# Patient Record
Sex: FEMALE | Race: WHITE | Employment: OTHER | URBAN - METROPOLITAN AREA
[De-identification: names, ages, dates, MRNs, and addresses within clinical notes are randomized per-mention and may not be internally consistent; named-entity substitution may affect disease eponyms.]

---

## 2017-05-11 ENCOUNTER — HOSPITAL ENCOUNTER (OUTPATIENT)
Dept: MAMMOGRAPHY | Age: 67
Discharge: HOME | End: 2017-05-11
Payer: MEDICARE

## 2017-05-11 DIAGNOSIS — Z12.31 VISIT FOR SCREENING MAMMOGRAM: ICD-10-CM

## 2017-05-11 PROCEDURE — 77063 BREAST TOMOSYNTHESIS BI: CPT

## 2017-05-17 ENCOUNTER — HOSPITAL ENCOUNTER (OUTPATIENT)
Dept: LAB | Age: 67
Discharge: HOME | End: 2017-05-17
Payer: MEDICARE

## 2017-05-17 PROCEDURE — 88175 CYTOPATH C/V AUTO FLUID REDO: CPT | Performed by: OBSTETRICS & GYNECOLOGY

## 2018-05-15 ENCOUNTER — HOSPITAL ENCOUNTER (OUTPATIENT)
Dept: MAMMOGRAPHY | Age: 68
Discharge: HOME | End: 2018-05-15
Attending: OBSTETRICS & GYNECOLOGY
Payer: MEDICARE

## 2018-05-15 DIAGNOSIS — Z12.39 BREAST CANCER SCREENING: ICD-10-CM

## 2018-05-15 PROCEDURE — 77067 SCR MAMMO BI INCL CAD: CPT

## 2018-05-24 ENCOUNTER — HOSPITAL ENCOUNTER (OUTPATIENT)
Dept: GENERAL RADIOLOGY | Age: 68
Discharge: HOME | End: 2018-05-24
Payer: MEDICARE

## 2018-05-24 DIAGNOSIS — R06.00 DYSPNEA: ICD-10-CM

## 2018-05-24 PROCEDURE — 71046 X-RAY EXAM CHEST 2 VIEWS: CPT

## 2018-06-23 ENCOUNTER — HOSPITAL ENCOUNTER (OUTPATIENT)
Dept: NON INVASIVE DIAGNOSTICS | Age: 68
Discharge: HOME | End: 2018-06-23
Attending: INTERNAL MEDICINE
Payer: MEDICARE

## 2018-06-23 VITALS
SYSTOLIC BLOOD PRESSURE: 150 MMHG | WEIGHT: 185 LBS | BODY MASS INDEX: 30.82 KG/M2 | DIASTOLIC BLOOD PRESSURE: 71 MMHG | HEIGHT: 65 IN

## 2018-06-23 DIAGNOSIS — R06.00 DYSPNEA: ICD-10-CM

## 2018-06-23 LAB
ECHO AO ASC DIAM: 2.89 CM
ECHO AO ROOT DIAM: 2.56 CM
ECHO AV PEAK GRADIENT: 0 MMHG
ECHO AV PEAK VELOCITY: 0 CM/S
ECHO AV REGURGITANT PHT: 696.6 CM
ECHO IVC SNIFF: 2.14 CM
ECHO LA VOL 2C: 55.46 ML (ref 22–52)
ECHO LA VOL 4C: 69.66 ML (ref 22–52)
ECHO LA VOLUME INDEX A2C: 28.98 ML/M2
ECHO LA VOLUME INDEX A4C: 36.4 ML/M2
ECHO LV EDV A2C: 102.5 ML
ECHO LV EDV A4C: 85.8 ML
ECHO LV EDV BP: 94.5 ML (ref 56–104)
ECHO LV EDV INDEX A4C: 44.8 ML/M2
ECHO LV EDV INDEX BP: 49.4 ML/M2
ECHO LV EDV NDEX A2C: 53.6 ML/M2
ECHO LV EJECTION FRACTION A2C: 62 %
ECHO LV EJECTION FRACTION A4C: 57 %
ECHO LV EJECTION FRACTION BIPLANE: 59.2 % (ref 55–100)
ECHO LV ESV A2C: 39.3 ML
ECHO LV ESV A4C: 36.5 ML
ECHO LV ESV BP: 38.5 ML (ref 19–49)
ECHO LV ESV INDEX A2C: 20.5 ML/M2
ECHO LV ESV INDEX A4C: 19.1 ML/M2
ECHO LV ESV INDEX BP: 20.1 ML/M2
ECHO LV INTERNAL DIMENSION DIASTOLIC: 4.54 CM (ref 3.9–5.3)
ECHO LV INTERNAL DIMENSION SYSTOLIC: 3.14 CM
ECHO LV IVSD: 0.83 CM (ref 0.6–0.9)
ECHO LV MASS 2D: 144 G (ref 67–162)
ECHO LV MASS INDEX 2D: 75.2 G/M2
ECHO LV POSTERIOR WALL DIASTOLIC: 0.89 CM (ref 0.6–0.9)
ECHO LVOT PEAK GRADIENT: 5.6 MMHG
ECHO LVOT PEAK VELOCITY: 118.38 CM/S
ECHO LVOT VTI: 25.28 CM
ECHO MV A VELOCITY: 89.76 CM/S
ECHO MV AREA PHT: 4 CM2
ECHO MV E DECELERATION TIME (DT): 189.9 MS
ECHO MV E VELOCITY: 0.71 CM/S
ECHO MV E/A RATIO: 0.8
ECHO MV PRESSURE HALF TIME (PHT): 55.1 MS
ECHO PV MAX VELOCITY: 108.2 CM/S
ECHO PV PEAK GRADIENT: 4.7 MMHG
ECHO PV REGURGITANT MAX VELOCITY: 163.51 CM/S
ECHO PVEIN A DURATION: 125.7 MS
ECHO PVEIN A VELOCITY: 27.49 CM/S
ECHO PVEIN PEAK D VELOCITY: 38.87 CM/S
ECHO PVEIN PEAK S VELOCITY: 69.8 CM/S
ECHO PVEIN S/D RATIO: 1.8
ECHO RIGHT VENTRICULAR SYSTOLIC PRESSURE (RVSP): 33 MMHG
ECHO RVOT DIAMETER: 3.27 CM
PISA AR MAX VEL: 392.53 CM/S

## 2018-06-23 PROCEDURE — 93306 TTE W/DOPPLER COMPLETE: CPT

## 2018-07-20 ENCOUNTER — HOSPITAL ENCOUNTER (OUTPATIENT)
Dept: PHYSICAL THERAPY | Age: 68
Discharge: HOME | End: 2018-07-20
Payer: MEDICARE

## 2018-07-20 PROCEDURE — 97112 NEUROMUSCULAR REEDUCATION: CPT

## 2018-07-20 PROCEDURE — G8987 SELF CARE CURRENT STATUS: HCPCS

## 2018-07-20 PROCEDURE — 97161 PT EVAL LOW COMPLEX 20 MIN: CPT

## 2018-07-20 PROCEDURE — G8988 SELF CARE GOAL STATUS: HCPCS

## 2018-07-20 NOTE — PROGRESS NOTES
[address] PHYSICAL THERAPY AT [facility] [address], [place] [address], [place] [address] - Phone: [phone]  Fax: [phone] / [address] [facility]  Patient Name: Genesis Cooper :    Medical   Diagnosis: Urinary incontinence [R32] Treatment Diagnosis: Urinary incontinence [R32]   Onset Date: 2018     Referral Source: Gilberto Joyner MD [facility]): 2018   Prior Hospitalization: See medical history Provider #: [id number]   Prior Level of Function: Chronic urinary symptoms for the past 2 years. Comorbidities: G3, P2, Colon surgery   Medications: Verified on Patient Summary List   The Plan of Care and following information is based on the information from the initial evaluation.   ==================================================================================  Assessment / key information: Patient is a 77 y.o. yo female  with vaginal deliveries who presents to [facility] with diagnosis of Urinary incontinence [R32]. Patient reports urinary incontinence occurring multiple times daily, urgency with inabiiity to delay at all, increased frequency of urination at 9-12 times daily and nocturia 2x nightly. Patient wears pantishields for protection. Patient presents to PT with severely impaired strength of pelvic floor muscles scoring 2-/0/0/10 on PERF. On biofeedback there was low  average fast twitch contraction at 5.52 microvolts and low average slow twitch contraction at 3.39 microvolts. She has impaired strength of both hips.  Patient can benefit from PT for retraining of muscle control and relaxation on biofeedback to increase pelvic floor muscle strength, decrease urinary incontinence, urgency and nocturia.    ==================================================================================  Eval Complexity: History: HIGH Complexity :3+ comorbidities / personal factors will impact the outcome/ POC Exam:HIGH Complexity : 4+ Standardized tests and measures addressing body structure, function, activity limitation and / or participation in recreation  Presentation: LOW Complexity : Stable, uncomplicated  Clinical Decision Making:Other outcome measures subjective improvement in urinary symptoms  HIGH Overall Complexity:LOW   Problem List: Pelvic pain/dysfunction, Decreased pelvic floor mm awareness, Decreased pelvic floor mm strength, Urinary urgency and Other  Treatment Plan may include any combination of the following: Therapeutic exercise, Urge suppression techniques, Neuromuscular re-education, Manual therapy, Physical agent/modality and Patient education  Patient / Family readiness to learn indicated by: asking questions, trying to perform skills and interest  Persons(s) to be included in education: patient (P)  Barriers to Learning/Limitations: yes;  sensory deficits-vision/hearing/speech  Measures taken: Speak loudly as needed   Patient Goal (s): Stop leaking and dribbling. Be able to make it to the bathroom. Patient self reported health status: good  Rehabilitation Potential: good  Short Term Goals: To be accomplished in 4 weeks:   1. Patient performing pelvic floor exercises TID. 2. Patient will report 25% subjective improvement in urinary incontinence with ADLS. 3. Increase average fast twitch contraction to 7 microvolts to increase continence. 4. Patient using urge suppression techniques. Long Term Goals: To be accomplished in 8 weeks:   1. Patient independent in HEP     2. Patient will report 50% subjective improvement in urinary incontinence with ADLs. 3. Increase average fast twitch contraction to 9 microvolts to increase continence. 4. Nocturia decreased to 2x nightly. 5. Patient able to delay urination 5 minutes.    Frequency / Duration:   Patient to be seen  1  times per week for 8  weeks:  Patient / Caregiver education and instruction:Exercises and Bladder Retraining  G-Codes (GP): Self Care:   Current  CN= 100%  [id number] Goal  CL= 60-79%. The severity rating is based on the FOTO Score/subjective improvement in urinary symptoms with ADLs      Therapist Signature: Lauren Garcia PT Date: 3/31/4750   Certification Period: 7/20/2018 to 10/19/2018 Time: 10:00 AM   ==================================================================================  I certify that the above Physical Therapy Services are being furnished while the patient is under my care. I agree with the treatment plan and certify that this therapy is necessary. Physician Signature:        Date:       Time:     Please sign and return to [facility] or you may fax the signed copy to [phone]. Thank you.

## 2018-07-20 NOTE — PROGRESS NOTES
PELVIC FLOOR DAILY TREATMENT NOTE     Patient Name: Garcia Bueno  NSBP:  : 1950  [x]  Patient  Verified  Payor: [place] MEDICARE / Plan: [place] MEDICARE PART A & B / Product Type: Medicare /    In time:9:10  Out time:9:57  Total Treatment Time (min): 47  Total Timed Codes (min): 10  1:1 Treatment Time (min): 10   Visit #: 1 of 8    Treatment Area: Urinary incontinence [R32]    SUBJECTIVE  Pain Level (0-10 scale): 0  Any medication changes, allergies to medications, adverse drug reactions, diagnosis change, or new procedure performed?: [x] No    [] Yes (see summary sheet for update)  Subjective functional status/changes:   [] No changes reported  See medical history    OBJECTIVE            10 min Patient Education: [x] Review HEP    [] Progressed/Changed HEP based on: Educated Pt in pelvic floor anatomy, function/dysfunction and correct execution of a pelvic floor contraction. Reviewed biofeedback results. positioning   [] body mechanics   [] transfers   [] heat/ice application          Pain Level (0-10 scale) post treatment: 0    ASSESSMENT/Changes in Function: Justification for Eval Code Complexity:  Patient History : G3, P3, chronic, Colon surgery  Examination see exam   Clinical Presentation: stable  Clinical Decision Making : Subjective improvement in urinary symptoms      Patient will continue to benefit from skilled PT services to modify and progress therapeutic interventions, address strength deficits, instruct in home and community integration and address UI, increased frequency, urgency and nocturia. to attain remaining goals. [x]  See Plan of Care  []  See progress note/recertification  []  See Discharge Summary         Progress towards goals / Updated goals:  Initial evaluation completed with home exercise program and education initiated.       PLAN  [x]  Upgrade activities as tolerated     []  Continue plan of care  []  Update interventions per flow sheet       []  Discharge due to:_  []  Other:_      Adelina Talamantes, PT 7/20/2018  9:10 AM      No future appointments.

## 2018-07-27 ENCOUNTER — HOSPITAL ENCOUNTER (OUTPATIENT)
Dept: PHYSICAL THERAPY | Age: 68
Discharge: HOME | End: 2018-07-27
Payer: MEDICARE

## 2018-07-27 PROCEDURE — 97112 NEUROMUSCULAR REEDUCATION: CPT

## 2018-07-27 PROCEDURE — 97110 THERAPEUTIC EXERCISES: CPT

## 2018-07-27 NOTE — PROGRESS NOTES
PELVIC FLOOR DAILY TREATMENT NOTE 8- Patient Name: Beryl Wood Date:2018 : 1950 [x]  Patient  Verified Payor: [place] MEDICARE / Plan: Dinesh Riveray / Product Type: Medicare / In time:7:39  Out time:8:23 Total Treatment Time (min): 44 Total Timed Codes (min): 44 
1:1 Treatment Time (min): 44 Visit #: 2 of 8 Treatment Area: Urge incontinence [N39.41] SUBJECTIVE Pain Level (0-10 scale): 0 Any medication changes, allergies to medications, adverse drug reactions, diagnosis change, or new procedure performed?: [x] No    [] Yes (see summary sheet for update) Subjective functional status/changes:   [] No changes reported Patient reports doing HEP 2x day. OBJECTIVE Modality rationale: Neuromuscular reeducation to improve patient's urinary incontinence occurring multiple times daily, urgency with inabiiity to delay at all, increased frequency of urination at 9-12 times daily and nocturia 2x nightly. Patient wears pantishields for protection. Min Type Additional Details 36 [x] Biofeedback x 36 minutes 
  supine vaginal  
 [] Estim: []Att   []Unatt        []TENS instruct []IFC  []Premod   []NMES []Other:  []w/US   []w/ice   []w/heat Position: Location:  
 []  Traction: [] Cervical       []Lumbar 
                     [] Prone          []Supine []Intermittent   []Continuous Lbs: 
[] before manual 
[] after manual  
 []  Ultrasound: []Continuous   [] Pulsed []1MHz   []3MHz Location: 
W/cm2:  
 []  Iontophoresis with dexamethasone Location: [] Take home patch  
[] In clinic  
 []  Ice     []  heat 
[]  Ice massage Position: Location:  
 []  Vasopneumatic Device Pressure:       [] lo [] med [] hi  
Temperature: [] lo [] med [] hi  
[x] Skin assessment post-treatment:  [x]intact []redness- no adverse reaction 
     []redness  adverse reaction:  
 
 
       
8  min Patient Education: [x] Review HEP    [] Progressed/Changed HEP based on: Bladder diary. Increase slow twitch to 5 second holds. [] positioning   [] body mechanics   [] transfers   [] heat/ice application Other Objective/Functional Measures:  
 []baseline resting tone: - 
 [x]slow twitch mms 17.7(11.4) [x]fast twitch mms16.4(8.86) Pain Level (0-10 scale) post treatment: 0 
 
ASSESSMENT/Changes in Function: Patient demonstrates improved strength of pelvic floor muscles with fair HEP compliance. Patient will continue to benefit from skilled PT services to modify and progress therapeutic interventions, address strength deficits, instruct in home and community integration and address UI, increased frequency, urgency and nocturia. to attain remaining goals. 
   
 
[]  See Plan of Care 
[]  See progress note/recertification 
[]  See Discharge Summary Progress towards goals / Updated goals: 1. Patient performing pelvic floor exercises TID. Met 2. Patient will report 25% subjective improvement in urinary incontinence with ADLS. 3. Increase average fast twitch contraction to 7 microvolts to increase continence. Met 4. Patient using urge suppression techniques PLAN [x]  Upgrade activities as tolerated     []  Continue plan of care 
[]  Update interventions per flow sheet      
[]  Discharge due to:_ 
[]  Other:_ Clinton Bender, PT 7/27/2018  7:39 AM 
 
 
Future Appointments Date Time Provider Bennie Vásquez 8/3/2018 8:15 AM Clinton Bender, PT [facility]  
8/10/2018 8:15 AM Clinton Bender, PT [facility]  
8/17/2018 8:15 AM Clinton Bender, PT [facility]  
8/31/2018 8:15 AM Clinton Bender, PT [facility]  
9/7/2018 8:15 AM Clinton Bender, PT [facility]  
9/14/2018 8:15 AM Clinton Bender, PT [facility]

## 2018-08-03 ENCOUNTER — HOSPITAL ENCOUNTER (OUTPATIENT)
Dept: PHYSICAL THERAPY | Age: 68
Discharge: HOME | End: 2018-08-03
Payer: MEDICARE

## 2018-08-03 PROCEDURE — 97110 THERAPEUTIC EXERCISES: CPT

## 2018-08-03 PROCEDURE — 97112 NEUROMUSCULAR REEDUCATION: CPT

## 2018-08-03 NOTE — PROGRESS NOTES
PELVIC FLOOR DAILY TREATMENT NOTE  Patient Name: Jayden Medellin Date:8/3/2018 : 1950 [x]  Patient  Verified Payor: [place] MEDICARE / Plan: Dinesh Espinoza y / Product Type: Medicare / In time:8:32  Out time:9:07 Total Treatment Time (min): 35 Total Timed Codes (min): 35 
1:1 Treatment Time (min): 35 Visit #: 3 of 8 Treatment Area: Urge incontinence [N39.41] SUBJECTIVE Pain Level (0-10 scale): 0 Any medication changes, allergies to medications, adverse drug reactions, diagnosis change, or new procedure performed?: [x] No    [] Yes (see summary sheet for update) Subjective functional status/changes:   [] No changes reported HEP 3x day OBJECTIVE Modality rationale: Neuromuscular reeducation to improve patient's urinary incontinence occurring multiple times daily, urgency with inabiiity to delay at all, increased frequency of urination at 9-12 times daily and nocturia 2x nightly.  Patient wears pantishields for protection. Min Type Additional Details 25 [x] Biofeedback x 25 minutes 
  supine vaginal  
 [] Estim: []Att   []Unatt        []TENS instruct []IFC  []Premod   []NMES []Other:  []w/US   []w/ice   []w/heat Position: Location:  
 []  Traction: [] Cervical       []Lumbar 
                     [] Prone          []Supine []Intermittent   []Continuous Lbs: 
[] before manual 
[] after manual  
 []  Ultrasound: []Continuous   [] Pulsed []1MHz   []3MHz Location: 
W/cm2:  
 []  Iontophoresis with dexamethasone Location: [] Take home patch  
[] In clinic  
 []  Ice     []  heat 
[]  Ice massage Position: Location:  
 []  Vasopneumatic Device Pressure:       [] lo [] med [] hi  
Temperature: [] lo [] med [] hi  
[x] Skin assessment post-treatment:  [x]intact []redness- no adverse reaction 
     []redness  adverse reaction:  
 
10 min Therapeutic Exercise:  [x] See flow sheet : 
[] Pelvic floor strengthening                []  Pelvic floor downtraining 
[]  Quality pelvic floor contractions      []  Relaxation techniques [x]  Urge suppression exercises 
[]  Other:   
Rationale:  to improve the patients ability to delay urination. min Patient Education: [x] Review HEP    [] Progressed/Changed HEP based on: Begin urge suppression techniques. [] positioning   [] body mechanics   [] transfers   [] heat/ice application Other Objective/Functional Measures: Bladder diary daytime average frequency 6x. []baseline resting tone: - 
 [x]slow twitch mms 13.7 [x]fast twitch mms12.9 Pain Level (0-10 scale) post treatment: 0 
 
ASSESSMENT/Changes in Function: Patient demonstrates improved strength of pelvic floor muscles. Patient will continue to benefit from skilled PT services to modify and progress therapeutic interventions, address strength deficits, instruct in home and community integration and address UI, increased frequency, urgency and nocturia. to attain remaining goals. 
   
  
[]  See Plan of Care 
[]  See progress note/recertification 
[]  See Discharge Summary 
   
Progress towards goals / Updated goals: 
                         1. Patient performing pelvic floor exercises TID.  Met 
                         2. Patient will report 25% subjective improvement in urinary incontinence with ADLS.                          3. Increase average fast twitch contraction to 7 microvolts to increase continence. Met 
                         4. Patient using urge suppression techniques. 
  
 
PLAN [x]  Upgrade activities as tolerated     []  Continue plan of care 
[]  Update interventions per flow sheet      
[]  Discharge due to:_ 
[]  Other:_ Lauren Garcia, PT 8/3/2018  8:32 AM 
 
 
Future Appointments Date Time Provider Bennie Vásquez 8/10/2018 8:15 AM Lauren Garcia, PT [facility]  
8/17/2018 8:15 AM Lauren Garcia, PT [facility]  
8/31/2018 8:15 AM Launie Saint, PT [facility]  
9/7/2018 8:15 AM Launie Saint, PT [facility]  
9/14/2018 8:15 AM Launie Saint, PT [facility]

## 2018-08-10 ENCOUNTER — HOSPITAL ENCOUNTER (OUTPATIENT)
Dept: PHYSICAL THERAPY | Age: 68
Discharge: HOME | End: 2018-08-10
Payer: MEDICARE

## 2018-08-10 PROCEDURE — 97014 ELECTRIC STIMULATION THERAPY: CPT

## 2018-08-10 PROCEDURE — 97112 NEUROMUSCULAR REEDUCATION: CPT

## 2018-08-10 NOTE — PROGRESS NOTES
PELVIC FLOOR DAILY TREATMENT NOTE     Patient Name: Ana Angeles  MBHT:  : 1950  [x]  Patient  Verified  Payor: [place] MEDICARE / Plan: [place] MEDICARE PART A & B / Product Type: Medicare /    In time:8:19  Out time:9:05  Total Treatment Time (min): 46  Total Timed Codes (min): 36  1:1 Treatment Time (min): 36   Visit #: 4 of 8    Treatment Area: Urge incontinence [N39.41]    SUBJECTIVE  Pain Level (0-10 scale): 0  Any medication changes, allergies to medications, adverse drug reactions, diagnosis change, or new procedure performed?: [x] No    [] Yes (see summary sheet for update)  Subjective functional status/changes:   [] No changes reported  Able to make it to the bathroom when first arising by using urge suppression techniques. OBJECTIVE  Modality rationale: Neuromuscular reeducation to improve patient's urinary incontinence occurring multiple times daily, urgency with inabiiity to delay at all, increased frequency of urination at 9-12 times daily and nocturia 2x nightly.  Patient wears pantishields for protection.    Min Type Additional Details   36 [x] Biofeedback x 36 minutes    supine and sit vaginal   10 [x] Estim: []Att   [x]Unatt        []TENS instruct                  []IFC  []Premod   [x]NMES 50 Hz 5/10 intensity 6                    []Other:  []w/US   []w/ice   []w/heat  Position:  Location:    []  Traction: [] Cervical       []Lumbar                       [] Prone          []Supine                       []Intermittent   []Continuous Lbs:  [] before manual  [] after manual    []  Ultrasound: []Continuous   [] Pulsed                           []1MHz   []3MHz Location:  W/cm2:    []  Iontophoresis with dexamethasone         Location: [] Take home patch   [] In clinic    []  Ice     []  heat  []  Ice massage Position:  Location:    []  Vasopneumatic Device Pressure:       [] lo [] med [] hi   Temperature: [] lo [] med [] hi   [x] Skin assessment post-treatment:  [x]intact []redness- no adverse reaction       []redness  adverse reaction:               min Patient Education: [x] Review HEP    [] Progressed/Changed HEP based on: HEP with add via pillow  [] positioning   [] body mechanics   [] transfers   [] heat/ice application        Other Objective/Functional Measures:    []baseline resting tone: -   [x]slow twitch mms -   [x]fast twitch mms10.9(6.52), 21.1(10.5) with add    Pain Level (0-10 scale) post treatment: 0    ASSESSMENT/Changes in Function:  Added accessory muscle use and NMES to PF muscles due to patient slow to strengthen. Patient will continue to benefit from skilled PT services to modify and progress therapeutic interventions, address strength deficits, instruct in home and community integration and address UI, increased frequency, urgency and nocturia. to attain remaining goals.      []  See Plan of Care  []  See progress note/recertification  []  See Discharge Summary         Progress towards goals / Updated goals:                           1. Patient performing pelvic floor exercises TID.  Met                           2. Patient will report 25% subjective improvement in urinary incontinence with ADLS.                          3. Increase average fast twitch contraction to 7 microvolts to increase continence.  Met                           4. Patient using urge suppression techniques.       PLAN  [x]  Upgrade activities as tolerated     []  Continue plan of care  []  Update interventions per flow sheet       []  Discharge due to:_  []  Other:_      Irene Burnham, PT 8/10/2018  8:19 AM      Future Appointments  Date Time Provider Bennie Vásquez   8/17/2018 8:15 AM Irene Burnham PT [facility]   8/31/2018 8:15 AM Irene Burnham PT [facility]   9/7/2018 8:15 AM Irene Burnham PT [facility]   9/14/2018 8:15 AM Irene Burnham, PT [facility]

## 2018-08-17 ENCOUNTER — HOSPITAL ENCOUNTER (OUTPATIENT)
Dept: PHYSICAL THERAPY | Age: 68
Discharge: HOME | End: 2018-08-17
Payer: MEDICARE

## 2018-08-17 PROCEDURE — 97014 ELECTRIC STIMULATION THERAPY: CPT

## 2018-08-17 PROCEDURE — 97112 NEUROMUSCULAR REEDUCATION: CPT

## 2018-08-17 NOTE — PROGRESS NOTES
PELVIC FLOOR DAILY TREATMENT NOTE     Patient Name: Tianna Oropeza  IOTA:  : 1950  [x]  Patient  Verified  Payor: [place] MEDICARE / Plan: [place] MEDICARE PART A & B / Product Type: Medicare /    In time:8:30  Out time:9:17  Total Treatment Time (min): 47  Total Timed Codes (min): 37  1:1 Treatment Time (min): 37   Visit #: 5 of 8    Treatment Area: Urge incontinence [N39.41]    SUBJECTIVE  Pain Level (0-10 scale): 0  Any medication changes, allergies to medications, adverse drug reactions, diagnosis change, or new procedure performed?: [x] No    [] Yes (see summary sheet for update)  Subjective functional status/changes:   [] No changes reported  \"I'm not having accidents like I used to\"    OBJECTIVE  Modality rationale: Neuromuscular reeducation to improve patient's urinary incontinence occurring multiple times daily, urgency with inabiiity to delay at all, increased frequency of urination at 9-12 times daily and nocturia 2x nightly.  Patient wears pantishields for protection.    Min Type Additional Details   37 [x] Biofeedback x 37 minutes    supine and sit vaginal with add   10 [x] Estim: []Att   [x]Unatt        []TENS instruct                  []IFC  []Premod   [x]NMES   50 hz 5/10 intensity 20                   []Other:  []w/US   []w/ice   []w/heat  Position: supine   Location: vaginal    []  Traction: [] Cervical       []Lumbar                       [] Prone          []Supine                       []Intermittent   []Continuous Lbs:  [] before manual  [] after manual    []  Ultrasound: []Continuous   [] Pulsed                           []1MHz   []3MHz Location:  W/cm2:    []  Iontophoresis with dexamethasone         Location: [] Take home patch   [] In clinic    []  Ice     []  heat  []  Ice massage Position:  Location:    []  Vasopneumatic Device Pressure:       [] lo [] med [] hi   Temperature: [] lo [] med [] hi   [x] Skin assessment post-treatment:  [x]intact []redness- no adverse reaction []redness  adverse reaction:                  min Patient Education: [x] Review HEP    [] Progressed/Changed HEP based on:   [] positioning   [] body mechanics   [] transfers   [] heat/ice application        Other Objective/Functional Measures:    []baseline resting tone: -   [x]slow twitch mms 24.6(12.7) with add seated   [x]fast twitch mms26.3(11.7)    Pain Level (0-10 scale) post treatment: 0    ASSESSMENT/Changes in Function: improved continence. Patient will continue to benefit from skilled PT services to modify and progress therapeutic interventions, address strength deficits, instruct in home and community integration and address UI, increased frequency, urgency and nocturia. to attain remaining goals.         []  See Plan of Care  []  See progress note/recertification  []  See Discharge Summary         Progress towards goals / Updated goals:                           1. Patient performing pelvic floor exercises TID.  Met                           2. Patient will report 25% subjective improvement in urinary incontinence with ADLS.                          3. Increase average fast twitch contraction to 7 microvolts to increase continence. Met                           4. Patient using urge suppression techniques.      PLAN  [x]  Upgrade activities as tolerated     []  Continue plan of care  []  Update interventions per flow sheet       []  Discharge due to:_  []  Other:_      Lauren Garcia, PT 8/17/2018  8:30 AM      Future Appointments  Date Time Provider Bennie Vásquez   8/31/2018 8:15 AM Lauren Garcia, PT [facility]   9/7/2018 8:15 AM Lauren Garcia, PT [facility]   9/14/2018 8:15 AM Lauren Garcia, PT [facility]

## 2018-08-31 ENCOUNTER — HOSPITAL ENCOUNTER (OUTPATIENT)
Dept: PHYSICAL THERAPY | Age: 68
Discharge: HOME | End: 2018-08-31
Payer: MEDICARE

## 2018-08-31 PROCEDURE — 97112 NEUROMUSCULAR REEDUCATION: CPT

## 2018-08-31 PROCEDURE — 97110 THERAPEUTIC EXERCISES: CPT

## 2018-08-31 NOTE — PROGRESS NOTES
[address] [place] [address] PHYSICAL THERAPY AT [address] [facility] [address], [address], [address] - Phone: [phone]  Fax: [phone] PROGRESS NOTE Patient Name: Wendy Manuel :  Treatment/Medical Diagnosis: Urge incontinence [N39.41] Referral Source: Kwame Rodrigues MD    
Date of Initial Visit: 2018 Attended Visits: 6 Missed Visits: 0  
SUMMARY OF TREATMENT 
PT has consisted of pelvic floor relaxation/strengthening via biofeedback, education as to 
pelvic floor anatomy and function/Bladder diary/Urge suppression techniques , Neuromuscular stimulation to the pelvic floor and home exercise program.  
CURRENT STATUS Patient has made excellent progress in PT with short term goals fully met. Functional progress Includes patient reporting 90% improvement in UI. Nocturia decreased from 3x nightly to 1-2x nightly. Patient demonstrates improved pelvic floor muscle strength. Goal/Measure of Progress Goal Met? 1. Patient performing pelvic floor exercises 3x day Status at last Eval: na Current Status: 3x day yes 2. Patient will report 25% subjective improvement in urinary incontinence with ADLs. Status at last Eval: na Current Status: 90% yes 3. Increase average fast twitch contraction to 7 microvolts to increase continence. 4.  Patient using urge suppression techniques. Bud Lucas Status at last Eval: 5.52 
na Current Status: 22.8 Patient using techniques Yes 
yes New Goals to be achieved in __4__  weeks: 1. Patient independent in HEP 2. Patient will increase FOTO urinary problem to 58 indicating improved continence and quality of life. 3. Increase average fast twitch contraction to 25 microvolts to increase continence. 4. Patient able to delay urination 5 minutes. RECOMMENDATIONS Continue pelvic floor PT 1x week for 4 weeks. If you have any questions/comments please contact us directly at [phone]. Thank you for allowing us to assist in the care of your patient. Therapist Signature: Radames Flores PT Date: 8/31/2018 Time: 7:36 AM  
NOTE TO PHYSICIAN:  PLEASE COMPLETE THE ORDERS BELOW AND [facility]: [phone]. If you are unable to process this request in 24 hours please contact our office: [phone]. 
 
___ I have read the above report and request that my patient continue as recommended.  
___ I have read the above report and request that my patient continue therapy with the following changes/special instructions:_________________________________________________________  
___ I have read the above report and request that my patient be discharged from therapy.   
 
Physician Signature:        Date:       Time:

## 2018-08-31 NOTE — PROGRESS NOTES
PELVIC FLOOR DAILY TREATMENT NOTE 8 Patient Name: Bladimir Fernandez Date:2018 : 1950 [x]  Patient  Verified Payor: [place] / Plan: Dinesh Gonzales / Product Type: Medicare / In time:8:23  Out time:9:08 Total Treatment Time (min): 45 Total Timed Codes (min): 45 
1:1 Treatment Time (min): 45 Visit #: 7 of 8 Treatment Area: Urge incontinence [N39.41] SUBJECTIVE Pain Level (0-10 scale): 0 Any medication changes, allergies to medications, adverse drug reactions, diagnosis change, or new procedure performed?: [x] No    [] Yes (see summary sheet for update) Subjective functional status/changes:   [] No changes reported Patient reports doing HEP 3x day. Leaking 90% improved. Doing quick contractions when has urgency and it works. Nocturia 1-2x nightly. OBJECTIVE Modality rationale: Neuromuscular reeducation to improve patient's urinary incontinence occurring multiple times daily, urgency with inabiiity to delay at all, increased frequency of urination at 9-12 times daily and nocturia 2x nightly.  Patient wears pantishields for protection. Min Type Additional Details 35 [x] Biofeedback x 35 minutes 
  sit surface [x] Estim: []Att   []Unatt        []TENS instruct []IFC  []Premod   []NMES                   []Other:  []w/US   []w/ice   []w/heat Position: Location:   
 []  Traction: [] Cervical       []Lumbar 
                     [] Prone          []Supine []Intermittent   []Continuous Lbs: 
[] before manual 
[] after manual  
 []  Ultrasound: []Continuous   [] Pulsed []1MHz   []3MHz Location: 
W/cm2:  
 []  Iontophoresis with dexamethasone Location: [] Take home patch  
[] In clinic  
 []  Ice     []  heat 
[]  Ice massage Position: Location:  
 []  Vasopneumatic Device Pressure:       [] lo [] med [] hi  
Temperature: [] lo [] med [] hi  
 [x] Skin assessment post-treatment:  [x]intact []redness- no adverse reaction 
     []redness  adverse reaction:  
 
10 min Therapeutic Exercise:  [x] See flow sheet : 
[]  Pelvic floor strengthening                []  Pelvic floor downtraining 
[]  Quality pelvic floor contractions      []  Relaxation techniques []  Urge suppression exercises 
[x]  Other:  Core strengthening Rationale: increase strength to improve the patients urinary symptoms 
 
 
      
 min Patient Education: [x] Review HEP    [] Progressed/Changed HEP based on: Increase slow twitch to 7 second holds. Add core exercises 3x week. [] positioning   [] body mechanics   [] transfers   [] heat/ice application Other Objective/Functional Measures:  
 []baseline resting tone: - 
 [x]slow twitch mms 23.4 [x]fast twitch mms22.8 Pain Level (0-10 scale) post treatment: 0 
 
ASSESSMENT/Changes in Function: See PN Patient will continue to benefit from skilled PT services to modify and progress therapeutic interventions, address strength deficits, instruct in home and community integration and address UI, increased frequency, urgency and nocturia. to attain remaining goals. 
   
  
[]  See Plan of Care [x]  See progress note/recertification 
[]  See Discharge Summary Progress towards goals / Updated goals: 
See PN 
 
PLAN [x]  Upgrade activities as tolerated     []  Continue plan of care 
[]  Update interventions per flow sheet      
[]  Discharge due to:_ 
[]  Other:_ Kelsey Meadows PT 8/31/2018  8:23 AM 
 
 
Future Appointments Date Time Provider Bennie Vásquez 9/7/2018 8:15 AM Kelsey Meadows, PT [facility]  
9/14/2018 8:15 AM Kelsey Meadows PT [facility]

## 2018-09-07 ENCOUNTER — HOSPITAL ENCOUNTER (OUTPATIENT)
Dept: PHYSICAL THERAPY | Age: 68
Discharge: HOME | End: 2018-09-07
Payer: MEDICARE

## 2018-09-07 PROCEDURE — 97112 NEUROMUSCULAR REEDUCATION: CPT

## 2018-09-07 PROCEDURE — 97110 THERAPEUTIC EXERCISES: CPT

## 2018-09-07 NOTE — PROGRESS NOTES
PELVIC FLOOR DAILY TREATMENT NOTE 8- Patient Name: Jerrod Hou Date:2018 : 1950 [x]  Patient  Verified Payor: [place] MEDICARE / Plan: Dinesh Gonzales / Product Type: Medicare / In time:8:29  Out time:9:23 Total Treatment Time (min): 54 Total Timed Codes (min): 54 
1:1 Treatment Time (min): 54 Visit #: 7 of 8 Treatment Area: Urge incontinence [N39.41] SUBJECTIVE Pain Level (0-10 scale): 0 Any medication changes, allergies to medications, adverse drug reactions, diagnosis change, or new procedure performed?: [x] No    [] Yes (see summary sheet for update) Subjective functional status/changes:   [] No changes reported Nocturia 1-2x nightly which is better than the 4x a night it used to be. OBJECTIVE Modality rationale: Neuromuscular reeducation to improve patient's urinary incontinence occurring multiple times daily, urgency with inabiiity to delay at all, increased frequency of urination at 9-12 times daily and nocturia 2x nightly.  Patient wears pantishields for protection. Min Type Additional Details 30 [x] Biofeedback x 30 minutes 
  stand surface  
 [] Estim: []Att   []Unatt        []TENS instruct []IFC  []Premod   []NMES []Other:  []w/US   []w/ice   []w/heat Position: Location:  
 []  Traction: [] Cervical       []Lumbar 
                     [] Prone          []Supine []Intermittent   []Continuous Lbs: 
[] before manual 
[] after manual  
 []  Ultrasound: []Continuous   [] Pulsed []1MHz   []3MHz Location: 
W/cm2:  
 []  Iontophoresis with dexamethasone Location: [] Take home patch  
[] In clinic  
 []  Ice     []  heat 
[]  Ice massage Position: Location:  
 []  Vasopneumatic Device Pressure:       [] lo [] med [] hi  
Temperature: [] lo [] med [] hi  
[x] Skin assessment post-treatment:  [x]intact []redness- no adverse reaction []redness  adverse reaction:  
24 min Therapeutic Exercise:  [x] See flow sheet : 
[]  Pelvic floor strengthening                []  Pelvic floor downtraining 
[]  Quality pelvic floor contractions      []  Relaxation techniques []  Urge suppression exercises 
[x]  Other:  Core strengthening, sleep longer exercises Rationale: increase strength to improve the patients urinary symptoms and allow patient to sleep through the night. 
       
 min Patient Education: [x] Review HEP    [] Progressed/Changed HEP based on: Increase slow twitch to 10 second holds [] positioning   [] body mechanics   [] transfers   [] heat/ice application Other Objective/Functional Measures:  
 []baseline resting tone: - 
 [x]slow twitch mms 24. 1(16.4) [x]fast twitch mms24. 9(12.4) Pain Level (0-10 scale) post treatment: 0 
 
ASSESSMENT/Changes in Function: Patient demonstrates improved strength of pelvic floor muscles with improved continence and decreased nocturia. Patient will continue to benefit from skilled PT services to modify and progress therapeutic interventions, address strength deficits, instruct in home and community integration and address UI, increased frequency, urgency and nocturia. to attain remaining goals. 
   
 
[]  See Plan of Care 
[]  See progress note/recertification 
[]  See Discharge Summary Progress towards goals / Updated goals: 
                         1. Patient independent in HEP. Progressing   
                         2. Patient will increase FOTO urinary problem to 58 indicating improved continence and quality of life. 
                         3. Increase average fast twitch contraction to 25 microvolts to increase continence.  Progressing 
                         4. Patient able to delay urination 5 minutes.  
  
 
PLAN [x]  Upgrade activities as tolerated     []  Continue plan of care 
[]  Update interventions per flow sheet []  Discharge due to:_ 
[]  Other:_ Ryan Barros PT 9/7/2018  8:29 AM 
 
 
Future Appointments Date Time Provider Bennie Vásquez 9/14/2018 8:15 AM Ryan Barros PT [facility]

## 2018-09-14 ENCOUNTER — APPOINTMENT (OUTPATIENT)
Dept: PHYSICAL THERAPY | Age: 68
End: 2018-09-14
Payer: MEDICARE

## 2018-09-25 ENCOUNTER — HOSPITAL ENCOUNTER (OUTPATIENT)
Dept: PHYSICAL THERAPY | Age: 68
Discharge: HOME | End: 2018-09-25
Payer: MEDICARE

## 2018-09-25 PROCEDURE — G8989 SELF CARE D/C STATUS: HCPCS

## 2018-09-25 PROCEDURE — G8988 SELF CARE GOAL STATUS: HCPCS

## 2018-09-25 PROCEDURE — 97110 THERAPEUTIC EXERCISES: CPT

## 2018-09-25 PROCEDURE — 97112 NEUROMUSCULAR REEDUCATION: CPT

## 2018-09-25 NOTE — PROGRESS NOTES
[address] [facility] [facility] [address], [address] - Phone: [phone]  Fax: [phone] DISCHARGE SUMMARY FOR PHYSICAL THERAPY Patient Name: Franco Campos :  Treatment/Medical Diagnosis: Urge incontinence [N39.41] Onset Date: 2018 Referral Source: Levi Gamble MD [facility]): 2018 Prior Hospitalization: See Medical History Provider #: [id number] Prior Level of Function: Chronic urinary symptoms for the past 2 years. Comorbidities: G3, P2, Colon surgery Medications: Verified on Patient Summary List  
Visits from [facility]: 8 Missed Visits: 0 Goal/Measure of Progress Goal Met? 1. Patient independent in home exercise program.  
Status at last Eval: progressing Current Status: Pt independent yes 2. Patient will increase FOTO urinary problem to 58 indicating improved continence and quality of life. Status at last Eval: 49 Current Status: 60 yes 3. Increase average  fast twitch contraction to 25 microvolts to increase continence. Status at last Eval: 22.8 Current Status: 33.1 yes 4. Patient able to delay urination 5 minutes. Status at last Eval: 1-2 minutes to not at all Current Status: 15 minutes yes Key Functional Changes/Progress: Patient has made excellent progress with PT demonstrating improved pelvic floor muscle strength and improved continence. Patient reports 80-90% improvement in urinary incontinence. Patient to continue on home exercise program. 
 
G-Codes (GP): Self Care: Self Care:   Goal  CL= 60-79%  D/C  CI= 1-19%. The severity rating is based on the Other Subjective improvement in urinary symptoms Assessments/Recommendations: Discontinue therapy. Progressing towards or have reached established goals. If you have any questions/comments please contact us directly at  [phone]. Thank you for allowing us to assist in the care of your patient. Therapist Signature: Jason Hoyt, PT Date: 9/25/2018 Reporting Period: 7/20/2018 to 9/25/2018 Time: 8:07 AM

## 2018-09-25 NOTE — PROGRESS NOTES
PELVIC FLOOR DAILY TREATMENT NOTE 8-14 Patient Name: Kenia Howard Date:2018 : 1950 [x]  Patient  Verified Payor: [place] MEDICARE / Plan: Dinesh Gonzales / Product Type: Medicare / In time:8:50  Out time:9:52 Total Treatment Time (min): 62 Total Timed Codes (min): 62 
1:1 Treatment Time (min): 62 Visit #: 8 of 8 Treatment Area: Urge incontinence [N39.41] SUBJECTIVE Pain Level (0-10 scale): 0 Any medication changes, allergies to medications, adverse drug reactions, diagnosis change, or new procedure performed?: [x] No    [] Yes (see summary sheet for update) Subjective functional status/changes:   [] No changes reported 80-90% improved re UI. Able to delay urination up to 15 minutes. OBJECTIVE Modality rationale: Neuromuscular reeducation to improve patient's urinary incontinence occurring multiple times daily, urgency with inabiiity to delay at all, increased frequency of urination at 9-12 times daily and nocturia 2x nightly.  Patient wears pantishields for protection. Min Type Additional Details 30 [x] Biofeedback x 30 minutes 
  stand surface  
 [] Estim: []Att   []Unatt        []TENS instruct []IFC  []Premod   []NMES []Other:  []w/US   []w/ice   []w/heat Position: Location:  
 []  Traction: [] Cervical       []Lumbar 
                     [] Prone          []Supine []Intermittent   []Continuous Lbs: 
[] before manual 
[] after manual  
 []  Ultrasound: []Continuous   [] Pulsed []1MHz   []3MHz Location: 
W/cm2:  
 []  Iontophoresis with dexamethasone Location: [] Take home patch  
[] In clinic  
 []  Ice     []  heat 
[]  Ice massage Position: Location:  
 []  Vasopneumatic Device Pressure:       [] lo [] med [] hi  
Temperature: [] lo [] med [] hi  
[x] Skin assessment post-treatment:  [x]intact []redness- no adverse reaction 
     []redness  adverse reaction: 17 min Therapeutic Exercise:  [x] See flow sheet : 
[]  Pelvic floor strengthening                []  Pelvic floor downtraining 
[]  Quality pelvic floor contractions      []  Relaxation techniques []  Urge suppression exercises [x]  Other:  Core strengthening                       
Rationale: increase strength of accessory muscles to improve the patients urinary symptoms. 
                                                                                          
 
       
15 min Patient Education: [x] Review HEP    [] Progressed/Changed HEP based on: D/C instructions [] positioning   [] body mechanics   [] transfers   [] heat/ice application Other Objective/Functional Measures:  
 []baseline resting tone: - 
 [x]slow twitch mms 35.4(20) [x]fast twitch mms33. 1(15.2) Pain Level (0-10 scale) post treatment: 0 
 
ASSESSMENT/Changes in Function: See D/C note Patient will continue to benefit from skilled PT services to na to attain remaining goals. []  See Plan of Care 
[]  See progress note/recertification 
[x]  See Discharge Summary Progress towards goals / Updated goals: 
See D/C note PLAN 
[]  Upgrade activities as tolerated     []  Continue plan of care 
[]  Update interventions per flow sheet [x]  Discharge due to:_Program completed with all goals met. []  Other:_ Darell Almaraz, PT 9/25/2018  9:17 AM 
 
 
No future appointments.

## 2019-05-15 ENCOUNTER — HOSPITAL ENCOUNTER (OUTPATIENT)
Dept: MAMMOGRAPHY | Age: 69
Discharge: HOME | End: 2019-05-15
Attending: OBSTETRICS & GYNECOLOGY
Payer: MEDICARE

## 2019-05-15 DIAGNOSIS — Z12.31 VISIT FOR SCREENING MAMMOGRAM: ICD-10-CM

## 2019-05-15 PROCEDURE — 77067 SCR MAMMO BI INCL CAD: CPT

## 2019-05-15 PROCEDURE — 77063 BREAST TOMOSYNTHESIS BI: CPT

## 2020-06-01 ENCOUNTER — HOSPITAL ENCOUNTER (OUTPATIENT)
Dept: GENERAL RADIOLOGY | Age: 70
Discharge: HOME | End: 2020-06-01
Attending: INTERNAL MEDICINE
Payer: MEDICARE

## 2020-06-01 ENCOUNTER — HOSPITAL ENCOUNTER (OUTPATIENT)
Dept: MAMMOGRAPHY | Age: 70
End: 2020-06-01
Attending: INTERNAL MEDICINE
Payer: MEDICARE

## 2020-06-01 DIAGNOSIS — M81.0 SENILE OSTEOPOROSIS: ICD-10-CM

## 2020-06-01 PROCEDURE — 77080 DXA BONE DENSITY AXIAL: CPT

## 2020-06-25 ENCOUNTER — HOSPITAL ENCOUNTER (OUTPATIENT)
Dept: MAMMOGRAPHY | Age: 70
Discharge: HOME | End: 2020-06-25
Attending: INTERNAL MEDICINE
Payer: MEDICARE

## 2020-06-25 DIAGNOSIS — Z12.31 VISIT FOR SCREENING MAMMOGRAM: ICD-10-CM

## 2020-06-25 PROCEDURE — 77063 BREAST TOMOSYNTHESIS BI: CPT

## 2021-06-29 ENCOUNTER — HOSPITAL ENCOUNTER (OUTPATIENT)
Dept: WOMENS IMAGING | Age: 71
Discharge: HOME | End: 2021-06-29
Attending: INTERNAL MEDICINE
Payer: MEDICARE

## 2021-06-29 DIAGNOSIS — Z12.31 ENCOUNTER FOR SCREENING MAMMOGRAM FOR BREAST CANCER: ICD-10-CM

## 2021-06-29 PROCEDURE — 77063 BREAST TOMOSYNTHESIS BI: CPT

## 2022-05-26 ENCOUNTER — HOSPITAL ENCOUNTER (OUTPATIENT)
Dept: LAB | Age: 72
Discharge: HOME | End: 2022-05-26
Payer: MEDICARE

## 2022-05-26 PROCEDURE — 87624 HPV HI-RISK TYP POOLED RSLT: CPT

## 2022-05-26 PROCEDURE — 88175 CYTOPATH C/V AUTO FLUID REDO: CPT

## 2022-07-06 ENCOUNTER — HOSPITAL ENCOUNTER (OUTPATIENT)
Dept: WOMENS IMAGING | Age: 72
Discharge: HOME | End: 2022-07-06
Attending: OBSTETRICS & GYNECOLOGY
Payer: MEDICARE

## 2022-07-06 DIAGNOSIS — Z12.31 ENCOUNTER FOR SCREENING MAMMOGRAM FOR BREAST CANCER: ICD-10-CM

## 2022-07-06 PROCEDURE — 77063 BREAST TOMOSYNTHESIS BI: CPT

## 2023-07-10 ENCOUNTER — HOSPITAL ENCOUNTER (OUTPATIENT)
Dept: WOMENS IMAGING | Facility: HOSPITAL | Age: 73
Discharge: HOME | End: 2023-07-13
Payer: MEDICARE

## 2023-07-10 DIAGNOSIS — Z12.31 VISIT FOR SCREENING MAMMOGRAM: ICD-10-CM

## 2023-07-10 PROCEDURE — 77063 BREAST TOMOSYNTHESIS BI: CPT

## 2024-07-11 ENCOUNTER — HOSPITAL ENCOUNTER (OUTPATIENT)
Dept: WOMENS IMAGING | Facility: HOSPITAL | Age: 74
Discharge: HOME | End: 2024-07-11
Payer: MEDICARE

## 2024-07-11 DIAGNOSIS — Z12.31 SCREENING MAMMOGRAM FOR BREAST CANCER: ICD-10-CM

## 2024-07-11 PROCEDURE — 77063 BREAST TOMOSYNTHESIS BI: CPT

## 2024-07-25 ENCOUNTER — HOSPITAL ENCOUNTER (OUTPATIENT)
Dept: WOMENS IMAGING | Facility: HOSPITAL | Age: 74
Discharge: HOME | End: 2024-07-25
Payer: MEDICARE

## 2024-07-25 ENCOUNTER — HOSPITAL ENCOUNTER (OUTPATIENT)
Facility: HOSPITAL | Age: 74
End: 2024-07-25
Payer: MEDICARE

## 2024-07-25 DIAGNOSIS — R92.8 ABNORMAL MAMMOGRAM: ICD-10-CM

## 2024-07-25 PROCEDURE — 76642 ULTRASOUND BREAST LIMITED: CPT

## 2024-07-25 PROCEDURE — G0279 TOMOSYNTHESIS, MAMMO: HCPCS

## 2025-07-17 ENCOUNTER — HOSPITAL ENCOUNTER (OUTPATIENT)
Dept: WOMENS IMAGING | Facility: HOSPITAL | Age: 75
Discharge: HOME | End: 2025-07-20
Payer: MEDICARE

## 2025-07-17 DIAGNOSIS — Z12.31 ENCOUNTER FOR SCREENING MAMMOGRAM FOR MALIGNANT NEOPLASM OF BREAST: ICD-10-CM

## 2025-07-17 PROCEDURE — 77063 BREAST TOMOSYNTHESIS BI: CPT
